# Patient Record
Sex: FEMALE | NOT HISPANIC OR LATINO | URBAN - METROPOLITAN AREA
[De-identification: names, ages, dates, MRNs, and addresses within clinical notes are randomized per-mention and may not be internally consistent; named-entity substitution may affect disease eponyms.]

---

## 2018-01-03 ENCOUNTER — OFFICE (OUTPATIENT)
Dept: URBAN - METROPOLITAN AREA CLINIC 78 | Facility: CLINIC | Age: 28
End: 2018-01-03

## 2018-01-03 VITALS
HEIGHT: 62 IN | TEMPERATURE: 97.7 F | DIASTOLIC BLOOD PRESSURE: 81 MMHG | SYSTOLIC BLOOD PRESSURE: 105 MMHG | HEART RATE: 75 BPM | WEIGHT: 221 LBS

## 2018-01-03 DIAGNOSIS — R10.13 EPIGASTRIC PAIN: ICD-10-CM

## 2018-01-03 PROCEDURE — 99244 OFF/OP CNSLTJ NEW/EST MOD 40: CPT

## 2018-01-03 NOTE — SERVICEHPINOTES
MARYJO MERIDA   is a   27   year old    female who is being seen in consultation at the request of   LEIDY ISAAC   for complaints of nausea, epigastric non-radiating abdominal pain, and bloating starting about 2-3 months ago.  She saw PCP around that time after having a dark tarry stool but stool occult was negative. H pylori breath test was negative although she may have taken some pepto bismol within a month of her test. She was on daily NSAIDs for about 1 month prior to symptom onset.  After that visit, she was started on protonix at the time and referred here. Symptoms have improved but there is intermittent burning more so when she has an empty stomach. She has gained about 10 lbs over the past 2 months since being on protonix.  No further melena, rectal bleeding, dysphagia, loss of appetite, weight loss, or any significant diarrhea. She had some constipation around the time symptoms started but now its back to normal. She rarely has heartburn. No family history of IBD, PUD, CRC.